# Patient Record
Sex: FEMALE | Race: WHITE | Employment: FULL TIME | URBAN - METROPOLITAN AREA
[De-identification: names, ages, dates, MRNs, and addresses within clinical notes are randomized per-mention and may not be internally consistent; named-entity substitution may affect disease eponyms.]

---

## 2017-10-15 ENCOUNTER — HOSPITAL ENCOUNTER (EMERGENCY)
Facility: CLINIC | Age: 32
Discharge: HOME | End: 2017-10-15
Attending: EMERGENCY MEDICINE | Admitting: EMERGENCY MEDICINE
Payer: COMMERCIAL

## 2017-10-15 ENCOUNTER — APPOINTMENT (OUTPATIENT)
Dept: GENERAL RADIOLOGY | Facility: CLINIC | Age: 32
End: 2017-10-15
Attending: EMERGENCY MEDICINE
Payer: COMMERCIAL

## 2017-10-15 VITALS
DIASTOLIC BLOOD PRESSURE: 87 MMHG | HEART RATE: 84 BPM | OXYGEN SATURATION: 96 % | TEMPERATURE: 97.7 F | RESPIRATION RATE: 16 BRPM | SYSTOLIC BLOOD PRESSURE: 135 MMHG

## 2017-10-15 DIAGNOSIS — S93.601A SPRAIN OF RIGHT FOOT, INITIAL ENCOUNTER: ICD-10-CM

## 2017-10-15 DIAGNOSIS — S93.621A LISFRANC'S SPRAIN, RIGHT, INITIAL ENCOUNTER: ICD-10-CM

## 2017-10-15 PROCEDURE — 99283 EMERGENCY DEPT VISIT LOW MDM: CPT

## 2017-10-15 PROCEDURE — 73630 X-RAY EXAM OF FOOT: CPT | Mod: RT

## 2017-10-15 RX ORDER — IBUPROFEN 800 MG/1
800 TABLET, FILM COATED ORAL ONCE
Status: DISCONTINUED | OUTPATIENT
Start: 2017-10-15 | End: 2017-10-15

## 2017-10-15 RX ORDER — IBUPROFEN 800 MG/1
800 TABLET, FILM COATED ORAL ONCE
Status: DISCONTINUED | OUTPATIENT
Start: 2017-10-15 | End: 2017-10-15 | Stop reason: HOSPADM

## 2017-10-15 ASSESSMENT — ENCOUNTER SYMPTOMS
WEAKNESS: 0
HEADACHES: 0
ABDOMINAL PAIN: 0
WOUND: 0
PALPITATIONS: 0
NECK STIFFNESS: 0
MYALGIAS: 1
BACK PAIN: 0
NUMBNESS: 0
NECK PAIN: 0
ARTHRALGIAS: 1
DIZZINESS: 0
LIGHT-HEADEDNESS: 0
NAUSEA: 0
JOINT SWELLING: 1
COLOR CHANGE: 1

## 2017-10-15 NOTE — ED NOTES
Pt presents for evaluation of right foot pain and swelling.  Reports she was dancing at a wedding and somehow twisted her foot.  CMS intact.

## 2017-10-15 NOTE — ED AVS SNAPSHOT
[facility] Emergency Department    [address] [place] [address]    Phone:  [phone]    Fax:  [phone]                                       Nicolette Archer   MRN: [id number]    Department:  [facility] Emergency Department   Date of Visit:  10/15/2017           Patient Information     Date Of Birth          1985        Your diagnoses for this visit were:     Sprain of right foot, initial encounter     Lisfranc's sprain, right, initial encounter        You were seen by Jonathan Hernandez MD.      Follow-up Information     Follow up with Podiatry this week to discuss possible surgical intervention. .      Discharge References/Attachments     LISFRANC JOINT INJURY, UNDERSTANDING (ENGLISH)      24 Hour Appointment Hotline       To make an appointment at any [facility] clinic, call [phone] ([phone]). If you don't have a family doctor or clinic, we will help you find one. [facility] clinics are conveniently located to serve the needs of you and your family.             Review of your medicines      Our records show that you are taking the medicines listed below. If these are incorrect, please call your family doctor or clinic.        Dose / Directions Last dose taken    TRAZODONE HCL PO        Refills:  0        ZOLOFT PO        Take by mouth daily   Refills:  0                Procedures and tests performed during your visit     Foot  XR, G/E 3 views, right    Ice to affected area      Orders Needing Specimen Collection     None      Pending Results     Date and Time Order Name Status Description    10/15/2017 0102 Foot  XR, G/E 3 views, right Preliminary             Pending Culture Results     No orders found from 10/13/2017 to 10/16/2017.            Pending Results Instructions     If you had any lab results that were not finalized at the time of your Discharge, you can call the ED Lab Result RN at [phone]. You will be contacted by this team for any  positive Lab results or changes in treatment. The nurses are available 7 days a week from 10A to 6:30P.  You can leave a message 24 hours per day and they will return your call.        Test Results From Your Hospital Stay        10/15/2017  1:27 AM      Narrative     XR FOOT RIGHT GREATER THAN 3 VIEWS  10/15/2017 1:19 AM      HISTORY: Midfoot pain after dancing.    COMPARISON: None.        Impression     IMPRESSION: No acute fracture or dislocation. Plantar calcaneal spur.                Clinical Quality Measure: Blood Pressure Screening     Your blood pressure was checked while you were in the emergency department today. The last reading we obtained was  BP: 135/87 . Please read the guidelines below about what these numbers mean and what you should do about them.  If your systolic blood pressure (the top number) is less than 120 and your diastolic blood pressure (the bottom number) is less than 80, then your blood pressure is normal. There is nothing more that you need to do about it.  If your systolic blood pressure (the top number) is 120-139 or your diastolic blood pressure (the bottom number) is 80-89, your blood pressure may be higher than it should be. You should have your blood pressure rechecked within a year by a primary care provider.  If your systolic blood pressure (the top number) is 140 or greater or your diastolic blood pressure (the bottom number) is 90 or greater, you may have high blood pressure. High blood pressure is treatable, but if left untreated over time it can put you at risk for heart attack, stroke, or kidney failure. You should have your blood pressure rechecked by a primary care provider within the next 4 weeks.  If your provider in the emergency department today gave you specific instructions to follow-up with your doctor or provider even sooner than that, you should follow that instruction and not wait for up to 4 weeks for your follow-up visit.        Thank you for choosing [facility]  "      Thank you for choosing [facility] for your care. Our goal is always to provide you with excellent care. Hearing back from our patients is one way we can continue to improve our services. Please take a few minutes to complete the written survey that you may receive in the mail after you visit with us. Thank you!        [employer] Information     [employer] lets you send messages to your doctor, view your test results, renew your prescriptions, schedule appointments and more. To sign up, go to [email or web address] . Click on \"Log in\" on the left side of the screen, which will take you to the Welcome page. Then click on \"Sign up Now\" on the right side of the page.     You will be asked to enter the access code listed below, as well as some personal information. Please follow the directions to create your username and password.     Your access code is: [email or web address]  Expires: 2018  1:40 AM     Your access code will  in 90 days. If you need help or a new code, please call your [facility] clinic or [phone].        Care EveryWhere ID     This is your Care EveryWhere ID. This could be used by other organizations to access your [facility] medical records  [id number]        Equal Access to Services     DOMINIQUE DUBON : Shannon Brock, wademida ella, qaybta kaalmada adeadal, alicia chen. So [facility] [phone].    ATENCIÓN: Si habla español, tiene a bates disposición servicios gratuitos de asistencia lingüística. Llame al [phone].    We comply with applicable federal civil rights laws and [place] laws. We do not discriminate on the basis of race, color, national origin, age, disability, sex, sexual orientation, or gender identity.            After Visit Summary       This is your record. Keep this with you and show to your community pharmacist(s) and doctor(s) at your next visit.                  "

## 2017-10-15 NOTE — ED AVS SNAPSHOT
[facility] Emergency Department    [address]    [facility] [address]    Phone:  [phone]    Fax:  [phone]                                       Nicolette Archer   MRN: [id number]    Department:  [facility] Emergency Department   Date of Visit:  10/15/2017           After Visit Summary Signature Page     I have received my discharge instructions, and my questions have been answered. I have discussed any challenges I see with this plan with the nurse or doctor.    ..........................................................................................................................................  Patient/Patient Representative Signature      ..........................................................................................................................................  Patient Representative Print Name and Relationship to Patient    ..................................................               ................................................  Date                                            Time    ..........................................................................................................................................  Reviewed by Signature/Title    ...................................................              ..............................................  Date                                                            Time

## 2017-10-15 NOTE — ED PROVIDER NOTES
History     Chief Complaint:  Foot Pain    HPI   Nicolette Archer is a 31 year old female who presents with foot pain. The patient states that she was dancing at a wedding prior to arrival when she injured her right foot. She states she does not remember specifically how she injured her foot, but has not been able to bare weight on her right foot for some time and has noticeable bruising. She states that her pain is localized to the top of her right foot; she was not wearing high heels or any lifting shoes at the time of injury. She denies any open wounds, falls, or any other symptoms/injuries.    Allergies:  Erythromycin    Medications:    Sertraline HCl (ZOLOFT PO)  TRAZODONE HCL PO     Past Medical History:    No significant past medical history.     Past Surgical History:    No pertinent past surgical history.    Family History:    No pertinent family history.    Social History:  Smoking status: Never smoker  Alcohol use: Yes  Marital Status:        Review of Systems   Cardiovascular: Positive for leg swelling. Negative for chest pain and palpitations.   Gastrointestinal: Negative for abdominal pain and nausea.   Musculoskeletal: Positive for arthralgias, gait problem, joint swelling and myalgias. Negative for back pain, neck pain and neck stiffness.   Skin: Positive for color change. Negative for rash and wound.   Neurological: Negative for dizziness, syncope, weakness, light-headedness, numbness and headaches.   All other systems reviewed and are negative.      Physical Exam     Patient Vitals for the past 24 hrs:   BP Temp Temp src Pulse Resp SpO2   10/15/17 0055 135/87 97.7  F (36.5  C) Oral 84 16 96 %       Physical Exam  Nursing note and vitals reviewed.  Constitutional: Cooperative. Slurring words.  Cardiovascular: Normal rate, regular rhythm. 2+ right DP pulse  Pulmonary/Chest: Effort normal.   Musculoskeletal: Normal range of motion of RLE. Swelling and ecchymosis to dosum of right foot. No  tenderness to the 5th MT or malleoli. Achilles tendon intact  Neurological: Alert.   Skin: Skin is warm and dry.See above  Psychiatric: Normal mood and affect.     Emergency Department Course   Imaging:  X-ray Foot G/E, right, 3 views:  IMPRESSION: No acute fracture or dislocation. Plantar calcaneal spur.  Result per radiology.      Interventions:  (0134) Ibuprofen, 800 mg, PO    Emergency Department Course:  Nursing notes and vitals reviewed.  (0059) I performed an exam of the patient as documented above.    The patient was sent for a x-ray while in the emergency department, findings above.     Findings and plan explained to the patient. Patient discharged home with instructions regarding supportive care, medications, and reasons to return. The importance of close follow-up was reviewed.    Impression & Plan      Medical Decision Making:  Nicolette Archer is a 31 year old female who presents with right foot pain due to landing awkwardly on her foot while dancing. She has some bruising on the dorsum of her mid right foot overlying the first and second metatarsal and a slight widening, by my read, on the x-ray of this joint space concerning for Saima-Franc dislocation and injury. There is no evidence of acute fracture. At this time we will treat her conservatively with non-weight baring boot placement, crutches, and follow up with pediatry for possible surgical consideration.    Diagnosis:    ICD-10-CM   1. Sprain of right foot, initial encounter S93.601A   2. Lisfranc's sprain, right, initial encounter S93.621A       Disposition:  Patient is discharged to home.        I, Dilip Little, am serving as a scribe on 10/15/2017 at 12:59 AM to personally document services performed by Dr. Hernandez based on my observations and the provider's statements to me.      Dilip Little  10/15/2017   [facility] EMERGENCY DEPARTMENT       Jonathan Hernandez MD  10/15/17 0245